# Patient Record
Sex: FEMALE | Race: WHITE | URBAN - METROPOLITAN AREA
[De-identification: names, ages, dates, MRNs, and addresses within clinical notes are randomized per-mention and may not be internally consistent; named-entity substitution may affect disease eponyms.]

---

## 2017-09-20 ENCOUNTER — TRANSFERRED RECORDS (OUTPATIENT)
Dept: HEALTH INFORMATION MANAGEMENT | Facility: CLINIC | Age: 37
End: 2017-09-20

## 2018-01-15 ENCOUNTER — OFFICE VISIT (OUTPATIENT)
Dept: OTOLARYNGOLOGY | Facility: CLINIC | Age: 38
End: 2018-01-15
Payer: COMMERCIAL

## 2018-01-15 VITALS — BODY MASS INDEX: 23.4 KG/M2 | WEIGHT: 158 LBS | HEIGHT: 69 IN

## 2018-01-15 DIAGNOSIS — R49.0 MUSCLE TENSION DYSPHONIA: Primary | ICD-10-CM

## 2018-01-15 DIAGNOSIS — R49.0 DYSPHONIA: Primary | ICD-10-CM

## 2018-01-15 DIAGNOSIS — R07.0 THROAT PAIN: ICD-10-CM

## 2018-01-15 DIAGNOSIS — J38.7 LARYNGEAL HYPERFUNCTION: ICD-10-CM

## 2018-01-15 PROBLEM — Q61.3 POLYCYSTIC KIDNEY DISEASE: Status: ACTIVE | Noted: 2018-01-15

## 2018-01-15 RX ORDER — DEXLANSOPRAZOLE 60 MG/1
CAPSULE, DELAYED RELEASE ORAL
COMMUNITY
Start: 2018-01-10

## 2018-01-15 ASSESSMENT — PAIN SCALES - GENERAL: PAINLEVEL: NO PAIN (0)

## 2018-01-15 NOTE — MR AVS SNAPSHOT
After Visit Summary   1/15/2018    Margret Mccain    MRN: [id number]           Patient Information     Date Of Birth          1980        Visit Information        Provider Department      1/15/2018 2:10 PM Raquel Brian MD [facility] Ear Nose and Throat        Today's Diagnoses     Muscle tension dysphonia    -  1    Laryngeal hyperfunction          Care Instructions    1.  You were seen in the ENT Clinic today by Dr. Brian.  If you have any questions or concerns after your appointment, please call [phone].  Press option #1 for scheduling related needs.  Press option #3 for Nurse advice.  2.  Please initiate speech therapy with a voice specialized SLP.       Yina LIZARRAGA, RN  [facility] ENT   Head & Neck Surgery               Follow-ups after your visit        Additional Services     OTOLARYNGOLOGY REFERRAL       SPEECH-LANGUAGE PATHOLOGY SERVICE(S) REQUESTED:  Evaluate and treat    Merlyn Dixon, Ph.D., CCC/SLP  Speech-Language Pathologist  Director, [facility]  [phone]    Edie Hope M.M., M.A., CCC/SLP  Speech-Language Pathologist  [facility]  [phone]                  Your next 10 appointments already scheduled     Feb 12, 2018  1:00 PM CST   (Arrive by 12:45 PM)   Return Visit with JENNIFER Cardenas   [facility] ([facility])    [address] [place] [id number] [phone]              Who to contact     Please call your clinic at [phone] to:    Ask questions about your health    Make or cancel appointments    Discuss your medicines    Learn about your test results    Speak to your doctor   If you have compliments or concerns about an experience at your clinic, or if you wish to file a complaint, please contact [facility] Patient Relations at [phone] or email us at [email or web address]         Additional Information About  "Your Visit        MyChart Information     [employer] gives you secure access to your electronic health record. If you see a primary care provider, you can also send messages to your care team and make appointments. If you have questions, please call your primary care clinic.  If you do not have a primary care provider, please call [phone] and they will assist you.      [employer] is an electronic gateway that provides easy, online access to your medical records. With [employer], you can request a clinic appointment, read your test results, renew a prescription or communicate with your care team.     To access your existing account, please contact your [facility] or call [phone] for assistance.        Care EveryWhere ID     This is your Care EveryWhere ID. This could be used by other organizations to access your [facility] medical records  [id number]        Your Vitals Were     Height BMI (Body Mass Index)                1.75 m (5' 8.9\") 23.4 kg/m2           Blood Pressure from Last 3 Encounters:   No data found for BP    Weight from Last 3 Encounters:   01/15/18 71.7 kg (158 lb)              We Performed the Following     IMAGESTREAM RECORDING ORDER     LARYNGOSCOPY FLEX/RIGID W STROBOSCOPY     OTOLARYNGOLOGY REFERRAL        Primary Care Provider    None Specified       No primary provider on file.        Equal Access to Services     ARVIN RINCON : Hadii hazel betho Soinderjit, waaxda luqadaha, qaybta kaalmada adeegyada, critsin hoang . So [facility] [phone].    ATENCIÓN: Si habla español, tiene a kwong disposición servicios gratuitos de asistencia lingüística. Llame al [phone].    We comply with applicable federal civil rights laws and [place] laws. We do not discriminate on the basis of race, color, national origin, age, disability, sex, sexual orientation, or gender identity.            Thank you!     Thank you for choosing [facility] EAR NOSE AND " THROAT  for your care. Our goal is always to provide you with excellent care. Hearing back from our patients is one way we can continue to improve our services. Please take a few minutes to complete the written survey that you may receive in the mail after your visit with us. Thank you!             Your Updated Medication List - Protect others around you: Learn how to safely use, store and throw away your medicines at www.disposemymeds.org.          This list is accurate as of 1/15/18 11:59 PM.  Always use your most recent med list.                   Brand Name Dispense Instructions for use Diagnosis    DEXILANT 60 MG Cpdr CR capsule   Generic drug:  dexlansoprazole           IRON SUPPLEMENT PO           VITAMIN D (CHOLECALCIFEROL) PO      Take 5,000 Units by mouth daily

## 2018-01-15 NOTE — PROGRESS NOTES
Dear Colleague:    I had the pleasure of meeting Margret Mccain in consultation at the [facility] of the [facility]ol[facility] on a self-referred basis, for evaluation of dysphonia. The note from our visit follows.  I appreciate the opportunity to participate in the care of this pleasant patient.    Please feel free to contact me with any questions.    Sincerely yours,    Raquel Brian M.D., M.P.H.  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 Symptoms include increased effort to talk/sing, throat irritation, throat tightness, poor voice quality, voice tires easily and change in range.      Voice  The patient reports a 3-1/2 year history of a voice problem.  There is no specific inciting event.  She believes the problem may be related to technique changes and range changes.  The problem began gradually and is worse after heavy voice use.  The problem is stable over time.  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    She has seen multiple otolaryngologists in the past, including Dr. Marvin Clark at the [facility]. She worked with Risa Nicole, JENNIFER in the fall.  She reports previously doing speech therapy locally with a nonspecialized therapist.  She also reports being diagnosed with reflux and treated for a number of years.  However, when she saw Dr. Clark, no evidence of reflux was seen, and the medications were stopped at that time.    She notes that the sensations vary in location and intensity without clear triggers.  Her primary concern is vocal fatigue. She has multiple  performances and heavy voice use coming up. She does report that she re-started Dexilant a few days ago and feels like perhaps her throat tension and voice symptoms have improved in response.      Swallowing  No concerns.       Cough/Throat-clearing  No concerns.       Breathing  No concerns.       Throat discomfort  She reports throat discomfort coming along with her voice concerns over the same period of time.  These also began gradually and are intermittent, but stable over time.   There is no accompanying throat pain. At times she does notice throat tension unexpectedly, when she is not in the middle of doing something.      Reflux-type symptoms  She never experiences heartburn/indigestion. She is taking reflux medications.      Prior outside records were reviewed for this visit.    MEDICATIONS:     Current Outpatient Prescriptions   Medication Sig Dispense Refill     dexlansoprazole (DEXILANT) 60 MG CPDR CR capsule        Ferrous Sulfate (IRON SUPPLEMENT PO)        VITAMIN D, CHOLECALCIFEROL, PO Take 5,000 Units by mouth daily         ALLERGIES:  No Known Allergies    PAST MEDICAL HISTORY:   Past Medical History:   Diagnosis Date     Anemia 1997    Off and on     Gastroesophageal reflux disease 2005    treated for quite awhile. Not sure if still a problem.     Kidney problem 1997    autosomal dominant polycystic kidney disease in family        PAST SURGICAL HISTORY:   Past Surgical History:   Procedure Laterality Date     GI SURGERY  2014    gall bladder removal     GYN SURGERY  2010    post-birth tissue repair     ORTHOPEDIC SURGERY  1997    ACL repair       HABITS/SOCIAL HISTORY:    Social History   Substance Use Topics     Smoking status: Never Smoker     Smokeless tobacco: Never Used     Alcohol use Yes      Comment: very rarely         FAMILY HISTORY:  No family history on file.    REVIEW OF SYSTEMS:  The patient completed a comprehensive 11 point review of systems (below), which was reviewed. Positives  are as noted below; pertinent findings are as noted in the HPI.     Patient Supplied Answers to Review of Systems  No flowsheet data found.    PHYSICAL EXAMINATION:  General: The patient was alert and conversant, and in no acute distress.    Eyes: PERRL, conjunctiva and lids normal, sclera nonicteric.  Nose: Anterior rhinoscopy: no gross abnormalities. no  bleeding; no  mucopurulence; septum grossly normal, mild mucoid drainage and/or crusting.  Oral cavity/oropharynx: No masses or lesions. Dentition in good condition. Floor of mouth and oral tongue soft to palpation. Tongue mobility and palate elevation intact and symmetric.  Ears: Normal auricles, external auditory canals bilaterally. Visualized portions of tympanic membranes normal bilaterally.   Neck: No palpable cervical lymphadenopathy. There was no significant tenderness to palpation of the thyrohyoid space, which was narrow. No obvious thyroid abnormality. Landmarks palpable.  Resp: Breathing comfortably, no stridor or stertor.  Neuro: Symmetric facial function. Other cranial nerves as documented above.  Psych: Normal affect, pleasant and cooperative.  Voice/speech: No significant dysphonia of speaking voice.  Extremities: No cyanosis, clubbing, or edema of the upper extremities.    Intake scores  Total Score for Last Patient-Answered VHI Questionnaire  VHI Total Score 1/4/2018   VHI Total Score 15     Total Score for Last Patient-Answered EAT Questionnaire  EAT Total Score 1/4/2018   EAT Total Score 0     Total Score for Last Patient-Answered CSI Questionnaire  CSI Total Score 1/4/2018   CSI Total Score 0         PROCEDURE:   Flexible fiberoptic laryngoscopy and laryngovideostroboscopy  Indications: This procedure was warranted to evaluate the patient's laryngeal anatomy and function. Risks, benefits, and alternatives were discussed.  Description: After written informed consent was obtained, a time-out was performed to confirm patient identity, procedure,  and procedure site. Topical 3% lidocaine with 0.25% phenylephrine was applied to the nasal cavities. I performed the endoscopy and no complications were apparent. Continuous and stroboscopic light were utilized to assess routine phonation and variable frequency phonation.  Performed by: Raquel Brian MD MPH  SLP: Edie Hope MM, MA, CCC-SLP   Findings: Normal nasopharynx. Normal base of tongue, valleculae, and epiglottis. Vocal fold mobility: right: normal; left: normal. Medial edges of the vocal folds: smooth and straight. No focal mucosal lesions were observed on the true vocal folds. Glissade produced appropriate elongation. There was moderate to severe supraglottic recruitment with connected speech. Mucosa of false vocal folds, aryepiglottic folds, piriform sinuses, and posterior glottis unremarkable. Airway was patent.    The addition of stroboscopy allowed evaluation of the mucosal wave.   Amplitude: right: normal; left: normal. Symmetry: good symmetry. Closure pattern: normal. Closure plane: at glottic level. Phase distribution: normal.      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     I recommended speech therapy as initial primary management, with goals including reducing laryngeal irritability, improving laryngeal efficiency and improving respiratory/phonatory coordination.      She asked about an unusual reflux-related reflex and whether that might be causing her symptoms. We discussed that while I cannot rule that out today, her presentation is fairly characteristic for muscle tension dysphonia and it would be logical to begin by first treating that with the standard of care, which is speech therapy. I encouraged her to pursue this with an Speech Language Pathologist who has specific expertise in voice and singing. She seemed comfortable with this thought process.    She will return as needed. I appreciate the opportunity to participate in the  care of this pleasant patient.

## 2018-01-15 NOTE — LETTER
1/15/2018      RE: Margret Mccain  [address] [facility] [address]       Dear Colleague:    I had the pleasure of meeting Margret Mccain in consultation at the [facility] of the [facility] on a self-referred basis, for evaluation of dysphonia. The note from our visit follows.  I appreciate the opportunity to participate in the care of this pleasant patient.    Please feel free to contact me with any questions.    Sincerely yours,    Raquel Brian M.D., M.P.H.  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 Symptoms include increased effort to talk/sing, throat irritation, throat tightness, poor voice quality, voice tires easily and change in range.      Voice  The patient reports a 3-1/2 year history of a voice problem.  There is no specific inciting event.  She believes the problem may be related to technique changes and range changes.  The problem began gradually and is worse after heavy voice use.  The problem is stable over time.  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    She has seen multiple otolaryngologists in the past, including Dr. Marvin Clark at the [facility]. She worked with Risa Nicole, JENNIFER in the fall.  She reports previously doing speech therapy locally with a nonspecialized therapist.  She also reports being diagnosed with reflux and treated for a number of years.  However, when she saw Dr. Clark, no evidence of reflux was seen, and the medications were stopped at that time.    She notes that the sensations vary in location and intensity without  clear triggers.  Her primary concern is vocal fatigue. She has multiple performances and heavy voice use coming up. She does report that she re-started Dexilant a few days ago and feels like perhaps her throat tension and voice symptoms have improved in response.      Swallowing  No concerns.       Cough/Throat-clearing  No concerns.       Breathing  No concerns.       Throat discomfort  She reports throat discomfort coming along with her voice concerns over the same period of time.  These also began gradually and are intermittent, but stable over time.   There is no accompanying throat pain. At times she does notice throat tension unexpectedly, when she is not in the middle of doing something.      Reflux-type symptoms  She never experiences heartburn/indigestion. She is taking reflux medications.      Prior outside records were reviewed for this visit.    MEDICATIONS:     Current Outpatient Prescriptions   Medication Sig Dispense Refill     dexlansoprazole (DEXILANT) 60 MG CPDR CR capsule        Ferrous Sulfate (IRON SUPPLEMENT PO)        VITAMIN D, CHOLECALCIFEROL, PO Take 5,000 Units by mouth daily         ALLERGIES:  No Known Allergies    PAST MEDICAL HISTORY:   Past Medical History:   Diagnosis Date     Anemia 1997    Off and on     Gastroesophageal reflux disease 2005    treated for quite awhile. Not sure if still a problem.     Kidney problem 1997    autosomal dominant polycystic kidney disease in family        PAST SURGICAL HISTORY:   Past Surgical History:   Procedure Laterality Date     GI SURGERY  2014    gall bladder removal     GYN SURGERY  2010    post-birth tissue repair     ORTHOPEDIC SURGERY  1997    ACL repair       HABITS/SOCIAL HISTORY:    Social History   Substance Use Topics     Smoking status: Never Smoker     Smokeless tobacco: Never Used     Alcohol use Yes      Comment: very rarely         FAMILY HISTORY:  No family history on file.    REVIEW OF SYSTEMS:  The patient completed a  comprehensive 11 point review of systems (below), which was reviewed. Positives are as noted below; pertinent findings are as noted in the HPI.     Patient Supplied Answers to Review of Systems  No flowsheet data found.    PHYSICAL EXAMINATION:  General: The patient was alert and conversant, and in no acute distress.    Eyes: PERRL, conjunctiva and lids normal, sclera nonicteric.  Nose: Anterior rhinoscopy: no gross abnormalities. no  bleeding; no  mucopurulence; septum grossly normal, mild mucoid drainage and/or crusting.  Oral cavity/oropharynx: No masses or lesions. Dentition in good condition. Floor of mouth and oral tongue soft to palpation. Tongue mobility and palate elevation intact and symmetric.  Ears: Normal auricles, external auditory canals bilaterally. Visualized portions of tympanic membranes normal bilaterally.   Neck: No palpable cervical lymphadenopathy. There was no significant tenderness to palpation of the thyrohyoid space, which was narrow. No obvious thyroid abnormality. Landmarks palpable.  Resp: Breathing comfortably, no stridor or stertor.  Neuro: Symmetric facial function. Other cranial nerves as documented above.  Psych: Normal affect, pleasant and cooperative.  Voice/speech: No significant dysphonia of speaking voice.  Extremities: No cyanosis, clubbing, or edema of the upper extremities.    Intake scores  Total Score for Last Patient-Answered VHI Questionnaire  VHI Total Score 1/4/2018   VHI Total Score 15     Total Score for Last Patient-Answered EAT Questionnaire  EAT Total Score 1/4/2018   EAT Total Score 0     Total Score for Last Patient-Answered CSI Questionnaire  CSI Total Score 1/4/2018   CSI Total Score 0         PROCEDURE:   Flexible fiberoptic laryngoscopy and laryngovideostroboscopy  Indications: This procedure was warranted to evaluate the patient's laryngeal anatomy and function. Risks, benefits, and alternatives were discussed.  Description: After written informed consent  was obtained, a time-out was performed to confirm patient identity, procedure, and procedure site. Topical 3% lidocaine with 0.25% phenylephrine was applied to the nasal cavities. I performed the endoscopy and no complications were apparent. Continuous and stroboscopic light were utilized to assess routine phonation and variable frequency phonation.  Performed by: Raquel Brian MD MPH  SLP: Edie Hope MM, MA, CCC-SLP   Findings: Normal nasopharynx. Normal base of tongue, valleculae, and epiglottis. Vocal fold mobility: right: normal; left: normal. Medial edges of the vocal folds: smooth and straight. No focal mucosal lesions were observed on the true vocal folds. Glissade produced appropriate elongation. There was moderate to severe supraglottic recruitment with connected speech. Mucosa of false vocal folds, aryepiglottic folds, piriform sinuses, and posterior glottis unremarkable. Airway was patent.    The addition of stroboscopy allowed evaluation of the mucosal wave.   Amplitude: right: normal; left: normal. Symmetry: good symmetry. Closure pattern: normal. Closure plane: at glottic level. Phase distribution: normal.      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     I recommended speech therapy as initial primary management, with goals including reducing laryngeal irritability, improving laryngeal efficiency and improving respiratory/phonatory coordination.      She asked about an unusual reflux-related reflex and whether that might be causing her symptoms. We discussed that while I cannot rule that out today, her presentation is fairly characteristic for muscle tension dysphonia and it would be logical to begin by first treating that with the standard of care, which is speech therapy. I encouraged her to pursue this with an Speech Language Pathologist who has specific expertise in voice and singing. She seemed comfortable with this thought  process.    She will return as needed. I appreciate the opportunity to participate in the care of this pleasant patient.           Raquel Brian MD

## 2018-01-15 NOTE — LETTER
"1/15/2018       RE: Margret Mccain  [facility] [address]     Dear Colleague,    Thank you for referring your patient, Margret Mccain, to the  [facility] at [facility]. Please see a copy of my visit note below.    After Visit Summary    Patient: Margret Mccain  Date of Visit: 1/15/2018    Hygiene:     Topical Hydration: hydration for the surface mucosa of the larynx and vocal folds.  o Please review Tips for Topical Hydration handout that was provided during session.  o I especially recommended increased gargling, as well as nasal irrigation and use of saline spray throughout the day.    Voice (2-3x/day unless otherwise noted):    Semi-occluded vocal tract exercise:   o Hunnnn/Tongue trill/Bubbles (straw in 1.5 to 2  of water) 4x/day for 1-2 min:  o 3x: sustained phonation (Ab3 = modal pitch today)  o 3x: gliding up and down             Up and down like a sine wave  o 3x:soft to loud to soft (messa di voce)    o 1-2x: Happy birthday (keep connected), or use for \"n\" or \"ng\" for practicing repertoire  These exercises are great for:    *warm up / cool down - Part of the morning routing and before and after extended voice use.    *tissue mobilization exercise - Improving the condition and pliability of the vocal folds.    *Abdominal breathing and applying optimal breath flow to speech/singing.       N+ vowels      Nnnnn-hnnnnn, Nnnnn-hnnnnnn yeah (carryover for speech)     n  words  o hold onto the  n  at the beginning of the word  o 5-1 descending pattern used today     n  sentences  o 3-5-1      Blending phrases: Practice longer phrases, also maintain a more optimal forward focus.      Consider purchasing \"The Voice Book\": Evie which also touches on resonant voice exercises      Areas for work with a massage therapist and/or PT who specializes in myofascial release of the head and neck area:   SCMs, scalenes, levator scapulae (bilateral), muscles and " "tissue involving and surrounding base of tongue, all strap muscles, shoulders, upper traps, diaphragm, any muscles involved in respiration and phonation.    Edie Hope M.M. (voice), M.A., CCC/SLP  Speech-Language Pathologist  [facility]  [phone]    [facility]  Myke Tirado Jr., M.D., F.A.C.S.  Raquel Brian M.D., M.P.H.  Merlyn Dixon, Ph.D., CCC/SLP  Edie Hope M.M. (voice), M.A., CCC/SLP  Krishna Sutton M.M. (voice), M.A., CCC/SLP    Evaluation report    Clinician: Edie Hope M.M. (voice), M.A., CCC/SLP  Seen in conjunction with: Dr. Brian  Referring physician:  [place]  Patient: Margret Mccain  Date of Visit: 1/15/2018    HISTORY  Chief complaint: Margret Mccain is a 37 year old presenting today for evaluation of dysphonia.  Salient history: She has a history significant for dysphonia over the last 3.5 years, which has been evaluated at other clinics in the past.  Onset: 3.5 years ago  Inciting incident: none  Course: relatively stable    CURRENT SYMPTOMS INCLUDE  VOICE    3.5 year Hx of voice issues that involve chronic fatigue while singing, increased effort to talk/ sing, poor voice quality, and changes in range.    Notes that her the throat tension she experiences along with poor voice quality is not tied to the act of singing.    Questions possible compensatory muscle issues with the throat and neck.    She has increased singing and speaking demands.  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      She has an additional 10 hours per week of \"lighter duties\" in the [employer], etc, where she often speaks over noise.    At home she often speaks with her  and three children.    She has intermittently completed brief courses of speech therapy elsewhere, but it has been inconsistent without long term success.    Her last intervention with Dr. Risa Nicole at the [facility] was for a brief session that involved SOVT and exercises to release " "base of tongue tension.    She notes that her singing voice often \"cuts out\" around F5-G5, while other times she will notice that her middle range around A4-B4 will have delayed onsets after only 5 minutes of singing.    She has not completed any vocalizing today, but they had a 4 hour drive from home.    COUGH/THROAT CLEARING    Denies any significant coughing or throat clearing.    Does experience throat irritation and tightness of the intrinsic and extrinsic neck muscles.  Neck tension seems to \"move around\", and can involve the back of the neck, sides, and/or near the clavicular notch.    SWALLOWING    Denies significant issues    RESPIRATION    WNL    ADDITIONAL    Denies Hx of reflux, but anti-reflux medications have been tried in the past, and she has been on Dexilant for the last 4 days; maybe helpful, but not sure.      She questions whether she may have a rare nerve issue associated with reflux today.    OTHER PERTINENT HISTORY    Otherwise unknown.  Please also refer to Dr. Brian's dictation.     Past Medical History:   Diagnosis Date     Anemia     Off and on     Gastroesophageal reflux disease     treated for quite awhile. Not sure if still a problem.     Kidney problem     autosomal dominant polycystic kidney disease in family     Past Surgical History:   Procedure Laterality Date     GI SURGERY      gall bladder removal     GYN SURGERY      post-birth tissue repair     ORTHOPEDIC SURGERY      ACL repair       OBJECTIVE  PATIENT REPORTED MEASURES    Effort to talk: 5 / 10 (0-10 in which 10 represents maximal effort)    Effort to sin / 10 (0-10 in which 10 represents maximal effort)    Voice quality: 5 / 10 (0-10 in which 10 represents best possible voice)  Patient Supplied Answers To VHI Questionnaire  Voice Handicap Index (VHI-10) 2018   My voice makes it difficult for people to hear me 2   People have difficulty understanding me in a noisy room 2   My voice " "difficulties restrict my personal and social life.  1   I feel left out of conversations because of my voice 0   My voice problem causes me to lose income 1   I feel as though I have to strain to produce voice 2   The clarity of my voice is unpredictable 2   My voice problem upsets me 4   My voice makes me feel handicapped 1   People ask, \"What's wrong with your voice?\" 0   VHI-10 15       Patient Supplied Answers To CSI Questionnaire  Cough Severity Index (CSI) 1/4/2018   My cough is worse when I lie down 0   My coughing problem causes me to restrict my personal and social life 0   I tend to avoid places because of my cough problem 0   I feel embarrassed because of my coughing problem 0   People ask, ''What's wrong?'' because I cough a lot 0   I run out of air when I cough 0   My coughing problem affects my voice 0   My coughing problem limits my physical activity 0   My coughing problem upsets me 0   People ask me if I am sick because I cough a lot 0   CSI Score 0       Patient Supplied Answers To EAT Questionnaire  Eating Assessment Tool (EAT-10) 1/4/2018   My swallowing problem has caused me to lose weight 0   My swallowing problem interferes with my ability to go out for meals 0   Swallowing liquids takes extra effort 0   Swallowing solids takes extra effort 0   Swallowing pills takes extra effort 0   Swallowing is painful 0   The pleasure of eating is affected by my swallowing 0   When I swallow food sticks in my throat 0   I cough when I eat 0   Swallowing is stressful 0   EAT-10 0       PERCEPTUAL EVALUATION (CPT 14229) - Recorded  POSTURE / TENSION:     upper body    neck and shoulders    BREATHING:     appears within normal limits and adequate     LARYNGEAL PALPATION:     firm musculature    Denies significant awareness of tenderness of the thyrohyoid area    reduced thyrohyoid space    VOICE:    Roughness: Mild Intermittent    Breathiness: WNL    Strain: WNL     Intermittent glottal " cui    Loudness    Conversational speech:  WNL    Projected speech:  WNL    Pitch:    Conversational speech:  WNL    Pitch glide: mild passaggio break present during ascending glide > than while descending    Resonance:    Conversational speech:  backward focus of resonance    Singing vs. Speech:  Singing voice is somewhat improved    CAPE-V Overall Severity:  41/100    COUGH/THROAT CLEARING:    Not observed    THERAPY PROBES: Improvement was elicited with use of forward resonant stimuli; less with other techniques today    LARYNGEAL EXAMINATION  Procedure: Flexible endoscopy with chip-tip technology with stroboscopy, right nostril; topical anesthesia with 3% Lidocaine and 0.25% phenylephrine was applied.   Performed by: Dr. Brian   The laryngeal and pharyngeal structures were evaluated for gross appearance, mobility, function, and focal lesions / abnormalities of the associated mucosa.  Stroboscopy was warranted to evaluate closure, symmetry, and vibratory characteristics of the vocal folds.  All findings were within normal limits with the exception of the following salient features:     Slight anterior gap observed while sustaining pitches.    Moderate to severe AP supraglottic constriction during connected speech and some singing tasks.    Therapeutic probes demonstrated increased AP constriction while attempting to utilize forward resonant techniques while completing techniques to improve breath flow; however, improvement with techniques was observed with them in isolation.    Stroboscopy demonstrated a relatively normal mucosal wave.      Abducted view of a healthy larynx      Slight anterior glottic gap    The laryngeal exam was reviewed with MsLissy Mccain, and I provided pertinent explanations, as well as written and oral information.    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 Dysphonia/discomfort is accounted for by the hyperfunction and imbalanced function of the intrinsic and extrinsic laryngeal musculature  .    STIMULABILITY: results of therapy probes during perceptual and laryngeal evaluation demonstrate improvement with use of forward resonant stimuli    RECOMMENDATIONS:     A course of speech therapy is recommended to optimize vocal technique, improve voice quality, promote reduced discomfort, effort and fatigue and help reduce mucosal irritation and neck tension.    She demonstrates a Good prognosis for improvement given adherence to therapeutic recommendations.     Positive indicators: positive response to therapy probes diagnosis is known to respond to treatment    Negative indicators: Distance from clinic is a consideration for her (approx 4 hours one way)    DURATION / FREQUENCY: 2 one-hour sessions weekly sessions, followed by 2 one-hour biweekly sessions.  A total of 6-8 sessions may be necessary.    GOALS:  Patient goal:   To improve and maintain a healthy voice quality  To understand the problem and fix it as much as possible  To have a normal and acceptable voice quality  To recover her former signing voice quality     Long-term goal(s): In 6 months, Ms. Mccain will:  Report a week of typical vocal activities, in which dysphonia and discomfort do not exceed a level of 3 out of 10, 80% of the time   Report a speaking and singing voice quality that is acceptable to her, 90%of the time  In a 20-minute conversation task, demonstrate 90% accuracy with forward resonance/optimal breath flow, by therapist judgment  In a singing task, demonstrate ability to vocalize to B4, five times in 10 minutes, with breathiness, delayed onset, and strain that does not exceed a level of 3 out of 10, 80% of the time, by therapist judgment    This treatment plan was developed with the patient who agreed with the  "recommendations.    _______________________________________________________________________  THERAPY NOTE (CPT 83322)  Date of Service: 1/15/2018    SUBJECTIVE / OBJECTIVE:  Please refer to my evaluation report from today's encounter for full details regarding subjective data, patient reported measures, and diagnostic findings.    THERAPEUTIC ACTIVITIES  Counseling and Education    Asked many questions about the nature of her symptoms, and I answered all of these thoroughly.    Exercises and techniques for optimal vocal hygiene including:    Topical hydration - Gargling (saline and plain water), saline nasal irrigation, humidification, steam, guaifenesin to reduce the thickened secretions / laryngeal irritation.    Awareness and reduction of phonotraumatic behaviors    Moderating voice use    Substituting non-voice alternative behaviors    Avoiding cough and throat clearing    Semi-Occluded Vocal Tract (SOVT) exercises instructed to reduce laryngeal tension, promote vocal fold pliability, and coordinate respiration and phonation    Tongue trill, /n/ and Straw phonation with water resistance was found to be most facilitating     Ascending and descending glides utilized to promote vocal fold pliability    \"Messa di voce\", gradual crescendo and decrescendo to vary medial compression was also utilized to promote vocal fold pliability.    Instructed on the benefits of using these exercises for improved coordination of breath flow with phonation and tissue mobilization.    Instructed on the importance of using these exercises as a warm-up / cool down,  and to re-calibrate the voice throughout the day.    75% accuracy with mild to moderate clinician support    Resonant Voice Therapy (RVT) exercises to promote forward locus of resonance and optimized pattern of laryngeal adduction    Speech material that elicits a high, forward tongue position (/n/) was most facilitating    Easy descending glide on /n/ utilized in " "conjunction with relaxed jaw, tongue, and lightly closed lips to facilitate forward resonant sound    Use of the carrier phrase \"nnhnn\" instructed to promote generalization to everyday speech    Syllable level using /n/ in alternation with cardinal vowels on sustained pitches and speech inflection    Word level exercises featuring nasal continuant loaded stimuli    Phrase level exercises featuring nasal continuants in more complex phonemic contexts were employed    Instructed with and interval of a descending 5th, as well as an arpeggio pattern was facilitating, prior to progressing to comfortable speech using optimal breath flow.    Negative practice was employed and was facilitative    Able to recognize improvement in quality and comfort      Concepts of an optimal regimen for practice were instructed.  o She should use an interval schedule of practice, with brief periods of practice frequently throughout each day  o [place] concepts of volitional practice to facilitate motor learning.    I provided an audio recording and handouts of today's therapeutic activities to facilitate practice.    ASSESSMENT/PLAN  PROGRESS TOWARD LONG TERM GOALS:   Minimal at this point, as this is first session, but good learning today    IMPRESSIONS: R49.0 (Dysphonia) and R07.0 (Throat Pain).     PLAN: I will see Ms. Mccain in 3 weeks, at which time we will continue to work with resonant voice techniques.     TOTAL SERVICE TIME: 30 minutes  EVALUATION OF VOICE AND RESONANCE: (12047): 60 minutes    TREATMENT (06835): 30 minutes  NO CHARGE FACILITY FEE (64321)    Edie Hope M.M. (voice) MOSWALDO, CCC/SLP  Speech-Language Pathologist  [facility]  [phone]  "

## 2018-01-15 NOTE — PATIENT INSTRUCTIONS
1.  You were seen in the ENT Clinic today by Dr. Brian.  If you have any questions or concerns after your appointment, please call [phone].  Press option #1 for scheduling related needs.  Press option #3 for Nurse advice.  2.  Please initiate speech therapy with a voice specialized SLP.       Yina LIZARRAGA, RN  [facility]   Head & Neck Surgery

## 2018-01-15 NOTE — MR AVS SNAPSHOT
After Visit Summary   1/15/2018    Mragret Mccain    MRN: [id number]           Patient Information     Date Of Birth          1980        Visit Information        Provider Department      1/15/2018 2:10 PM Edie Hope, SLP [facility]        Today's Diagnoses     Dysphonia    -  1    Throat pain           Follow-ups after your visit        Who to contact     Please call your clinic at [phone] to:    Ask questions about your health    Make or cancel appointments    Discuss your medicines    Learn about your test results    Speak to your doctor   If you have compliments or concerns about an experience at your clinic, or if you wish to file a complaint, please contact [facility] Patient Relations at [phone] or email us at [email or web address]         Additional Information About Your Visit        MyChart Information     [employer] gives you secure access to your electronic health record. If you see a primary care provider, you can also send messages to your care team and make appointments. If you have questions, please call your primary care clinic.  If you do not have a primary care provider, please call [phone] and they will assist you.      [employer] is an electronic gateway that provides easy, online access to your medical records. With [employer], you can request a clinic appointment, read your test results, renew a prescription or communicate with your care team.     To access your existing account, please contact your [facility] or call [phone] for assistance.        Care EveryWhere ID     This is your Care EveryWhere ID. This could be used by other organizations to access your [facility] medical records  [id number]         Blood Pressure from Last 3 Encounters:   No data found for BP    Weight from Last 3 Encounters:   01/15/18 71.7 kg (158 lb)              We Performed the Following     C BEHAVIORAL &  QUALITATIVE ANALYSIS VOICE AND RESONANCE     SPEECH/HEARING THERAPY, INDIVIDUAL        Primary Care Provider    None Specified       No primary provider on file.        Equal Access to Services     ARVIN RINCON : Hadii aad ku hadbaldemaremely Brennan, ninoska cosme, christopherreinier albarranmitchelcristin almanzaregancristy prajapati. So [facility] [phone].    ATENCIÓN: Si habla español, tiene a kwong disposición servicios gratuitos de asistencia lingüística. Llame al [phone].    We comply with applicable federal civil rights laws and [place] laws. We do not discriminate on the basis of race, color, national origin, age, disability, sex, sexual orientation, or gender identity.            Thank you!     Thank you for choosing [facility]  for your care. Our goal is always to provide you with excellent care. Hearing back from our patients is one way we can continue to improve our services. Please take a few minutes to complete the written survey that you may receive in the mail after your visit with us. Thank you!             Your Updated Medication List - Protect others around you: Learn how to safely use, store and throw away your medicines at www.disposemymeds.org.          This list is accurate as of: 1/15/18 11:59 PM.  Always use your most recent med list.                   Brand Name Dispense Instructions for use Diagnosis    DEXILANT 60 MG Cpdr CR capsule   Generic drug:  dexlansoprazole           IRON SUPPLEMENT PO           VITAMIN D (CHOLECALCIFEROL) PO      Take 5,000 Units by mouth daily

## 2018-01-16 NOTE — PROGRESS NOTES
"After Visit Summary    Patient: Margret Mccain  Date of Visit: 1/15/2018    Hygiene:     Topical Hydration: hydration for the surface mucosa of the larynx and vocal folds.  o Please review Tips for Topical Hydration handout that was provided during session.  o I especially recommended increased gargling, as well as nasal irrigation and use of saline spray throughout the day.    Voice (2-3x/day unless otherwise noted):    Semi-occluded vocal tract exercise:   o Hunnnn/Tongue trill/Bubbles (straw in 1.5 to 2  of water) 4x/day for 1-2 min:  o 3x: sustained phonation (Ab3 = modal pitch today)  o 3x: gliding up and down             Up and down like a sine wave  o 3x:soft to loud to soft (messa di voce)    o 1-2x: Happy birthday (keep connected), or use for \"n\" or \"ng\" for practicing repertoire  These exercises are great for:    *warm up / cool down - Part of the morning routing and before and after extended voice use.    *tissue mobilization exercise - Improving the condition and pliability of the vocal folds.    *Abdominal breathing and applying optimal breath flow to speech/singing.       N+ vowels      Nnnnn-hnnnnn, Nnnnn-hnnnnnn yeah (carryover for speech)     n  words  o hold onto the  n  at the beginning of the word  o 5-1 descending pattern used today     n  sentences  o 3-5-1      Blending phrases: Practice longer phrases, also maintain a more optimal forward focus.      Consider purchasing \"The Voice Book\": Evie which also touches on resonant voice exercises      Areas for work with a massage therapist and/or PT who specializes in myofascial release of the head and neck area:   SCMs, scalenes, levator scapulae (bilateral), muscles and tissue involving and surrounding base of tongue, all strap muscles, shoulders, upper traps, diaphragm, any muscles involved in respiration and phonation.    Edie Hope M.M. (voice), MLissyA., CCC/SLP  Speech-Language Pathologist  [facility] " [facility]  [phone]

## 2018-01-18 NOTE — PROGRESS NOTES
"[facility] VOICE CLINIC  Myke Tirado Jr., M.D., F.A.C.S.  Raquel Brian M.D., M.P.H.  Merlyn Dixon, Ph.D., CCC/SLP  Edie Hope M.M. (voice), M.A., CCC/SLP  Krishna Sutton M.M. (voice), M.A., CCC/SLP    Evaluation report    Clinician: Edie Hope M.M. (voice), M.A., CCC/SLP  Seen in conjunction with: Dr. Brian  Referring physician:  [place]  Patient: Margret Mccain  Date of Visit: 1/15/2018    HISTORY  Chief complaint: Margret Mccain is a 37 year old presenting today for evaluation of dysphonia.  Salient history: She has a history significant for dysphonia over the last 3.5 years, which has been evaluated at other clinics in the past.  Onset: 3.5 years ago  Inciting incident: none  Course: relatively stable    CURRENT SYMPTOMS INCLUDE  VOICE    3.5 year Hx of voice issues that involve chronic fatigue while singing, increased effort to talk/ sing, poor voice quality, and changes in range.    Notes that her the throat tension she experiences along with poor voice quality is not tied to the act of singing.    Questions possible compensatory muscle issues with the throat and neck.    She has increased singing and speaking demands.  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      She has an additional 10 hours per week of \"lighter duties\" in the [employer] shop, etc, where she often speaks over noise.    At home she often speaks with her  and three children.    She has intermittently completed brief courses of speech therapy elsewhere, but it has been inconsistent without long term success.    Her last intervention with Dr. Risa Nicole at the [facility] was for a brief session that involved SOVT and exercises to release base of tongue tension.    She notes that her singing voice often \"cuts out\" around F5-G5, while other times she will notice that her middle range around A4-B4 will have delayed onsets after only 5 minutes of singing.    She has not completed any vocalizing today, " "but they had a 4 hour drive from home.    COUGH/THROAT CLEARING    Denies any significant coughing or throat clearing.    Does experience throat irritation and tightness of the intrinsic and extrinsic neck muscles.  Neck tension seems to \"move around\", and can involve the back of the neck, sides, and/or near the clavicular notch.    SWALLOWING    Denies significant issues    RESPIRATION    WNL    ADDITIONAL    Denies Hx of reflux, but anti-reflux medications have been tried in the past, and she has been on Dexilant for the last 4 days; maybe helpful, but not sure.      She questions whether she may have a rare nerve issue associated with reflux today.    OTHER PERTINENT HISTORY    Otherwise unknown.  Please also refer to Dr. Brian's dictation.     Past Medical History:   Diagnosis Date     Anemia     Off and on     Gastroesophageal reflux disease     treated for quite awhile. Not sure if still a problem.     Kidney problem     autosomal dominant polycystic kidney disease in family     Past Surgical History:   Procedure Laterality Date     GI SURGERY      gall bladder removal     GYN SURGERY      post-birth tissue repair     ORTHOPEDIC SURGERY      ACL repair       OBJECTIVE  PATIENT REPORTED MEASURES    Effort to talk: 5 / 10 (0-10 in which 10 represents maximal effort)    Effort to sin / 10 (0-10 in which 10 represents maximal effort)    Voice quality: 5 / 10 (0-10 in which 10 represents best possible voice)  Patient Supplied Answers To VHI Questionnaire  Voice Handicap Index (VHI-10) 2018   My voice makes it difficult for people to hear me 2   People have difficulty understanding me in a noisy room 2   My voice difficulties restrict my personal and social life.  1   I feel left out of conversations because of my voice 0   My voice problem causes me to lose income 1   I feel as though I have to strain to produce voice 2   The clarity of my voice is unpredictable 2   My voice problem " "upsets me 4   My voice makes me feel handicapped 1   People ask, \"What's wrong with your voice?\" 0   VHI-10 15       Patient Supplied Answers To CSI Questionnaire  Cough Severity Index (CSI) 1/4/2018   My cough is worse when I lie down 0   My coughing problem causes me to restrict my personal and social life 0   I tend to avoid places because of my cough problem 0   I feel embarrassed because of my coughing problem 0   People ask, ''What's wrong?'' because I cough a lot 0   I run out of air when I cough 0   My coughing problem affects my voice 0   My coughing problem limits my physical activity 0   My coughing problem upsets me 0   People ask me if I am sick because I cough a lot 0   CSI Score 0       Patient Supplied Answers To EAT Questionnaire  Eating Assessment Tool (EAT-10) 1/4/2018   My swallowing problem has caused me to lose weight 0   My swallowing problem interferes with my ability to go out for meals 0   Swallowing liquids takes extra effort 0   Swallowing solids takes extra effort 0   Swallowing pills takes extra effort 0   Swallowing is painful 0   The pleasure of eating is affected by my swallowing 0   When I swallow food sticks in my throat 0   I cough when I eat 0   Swallowing is stressful 0   EAT-10 0       PERCEPTUAL EVALUATION (CPT 65196) - Recorded  POSTURE / TENSION:     upper body    neck and shoulders    BREATHING:     appears within normal limits and adequate     LARYNGEAL PALPATION:     firm musculature    Denies significant awareness of tenderness of the thyrohyoid area    reduced thyrohyoid space    VOICE:    Roughness: Mild Intermittent    Breathiness: WNL    Strain: WNL     Intermittent glottal cui    Loudness    Conversational speech:  WNL    Projected speech:  WNL    Pitch:    Conversational speech:  WNL    Pitch glide: mild passaggio break present during ascending glide > than while descending    Resonance:    Conversational speech:  backward focus of resonance    Singing vs. Speech:  " Singing voice is somewhat improved    CAPE-V Overall Severity:  41/100    COUGH/THROAT CLEARING:    Not observed    THERAPY PROBES: Improvement was elicited with use of forward resonant stimuli; less with other techniques today    LARYNGEAL EXAMINATION  Procedure: Flexible endoscopy with chip-tip technology with stroboscopy, right nostril; topical anesthesia with 3% Lidocaine and 0.25% phenylephrine was applied.   Performed by: Dr. Brian   The laryngeal and pharyngeal structures were evaluated for gross appearance, mobility, function, and focal lesions / abnormalities of the associated mucosa.  Stroboscopy was warranted to evaluate closure, symmetry, and vibratory characteristics of the vocal folds.  All findings were within normal limits with the exception of the following salient features:     Slight anterior gap observed while sustaining pitches.    Moderate to severe AP supraglottic constriction during connected speech and some singing tasks.    Therapeutic probes demonstrated increased AP constriction while attempting to utilize forward resonant techniques while completing techniques to improve breath flow; however, improvement with techniques was observed with them in isolation.    Stroboscopy demonstrated a relatively normal mucosal wave.      Abducted view of a healthy larynx      Slight anterior glottic gap    The laryngeal exam was reviewed with Ms. Mccain, and I provided pertinent explanations, as well as written and oral information.    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 Dysphonia/discomfort is accounted for by the hyperfunction and imbalanced function of the intrinsic and extrinsic laryngeal musculature  .    STIMULABILITY: results of therapy probes during perceptual and laryngeal evaluation demonstrate improvement with use of  forward resonant stimuli    RECOMMENDATIONS:     A course of speech therapy is recommended to optimize vocal technique, improve voice quality, promote reduced discomfort, effort and fatigue and help reduce mucosal irritation and neck tension.    She demonstrates a Good prognosis for improvement given adherence to therapeutic recommendations.     Positive indicators: positive response to therapy probes diagnosis is known to respond to treatment    Negative indicators: Distance from clinic is a consideration for her (approx 4 hours one way)    DURATION / FREQUENCY: 2 one-hour sessions weekly sessions, followed by 2 one-hour biweekly sessions.  A total of 6-8 sessions may be necessary.    GOALS:  Patient goal:   To improve and maintain a healthy voice quality  To understand the problem and fix it as much as possible  To have a normal and acceptable voice quality  To recover her former signing voice quality     Long-term goal(s): In 6 months, Ms. Mccain will:  Report a week of typical vocal activities, in which dysphonia and discomfort do not exceed a level of 3 out of 10, 80% of the time   Report a speaking and singing voice quality that is acceptable to her, 90%of the time  In a 20-minute conversation task, demonstrate 90% accuracy with forward resonance/optimal breath flow, by therapist judgment  In a singing task, demonstrate ability to vocalize to B4, five times in 10 minutes, with breathiness, delayed onset, and strain that does not exceed a level of 3 out of 10, 80% of the time, by therapist judgment    This treatment plan was developed with the patient who agreed with the recommendations.    _______________________________________________________________________  THERAPY NOTE (CPT 23496)  Date of Service: 1/15/2018    SUBJECTIVE / OBJECTIVE:  Please refer to my evaluation report from today's encounter for full details regarding subjective data, patient reported measures, and diagnostic findings.    THERAPEUTIC  "ACTIVITIES  Counseling and Education    Asked many questions about the nature of her symptoms, and I answered all of these thoroughly.    Exercises and techniques for optimal vocal hygiene including:    Topical hydration - Gargling (saline and plain water), saline nasal irrigation, humidification, steam, guaifenesin to reduce the thickened secretions / laryngeal irritation.    Awareness and reduction of phonotraumatic behaviors    Moderating voice use    Substituting non-voice alternative behaviors    Avoiding cough and throat clearing    Semi-Occluded Vocal Tract (SOVT) exercises instructed to reduce laryngeal tension, promote vocal fold pliability, and coordinate respiration and phonation    Tongue trill, /n/ and Straw phonation with water resistance was found to be most facilitating     Ascending and descending glides utilized to promote vocal fold pliability    \"Messa di voce\", gradual crescendo and decrescendo to vary medial compression was also utilized to promote vocal fold pliability.    Instructed on the benefits of using these exercises for improved coordination of breath flow with phonation and tissue mobilization.    Instructed on the importance of using these exercises as a warm-up / cool down,  and to re-calibrate the voice throughout the day.    75% accuracy with mild to moderate clinician support    Resonant Voice Therapy (RVT) exercises to promote forward locus of resonance and optimized pattern of laryngeal adduction    Speech material that elicits a high, forward tongue position (/n/) was most facilitating    Easy descending glide on /n/ utilized in conjunction with relaxed jaw, tongue, and lightly closed lips to facilitate forward resonant sound    Use of the carrier phrase \"nnhnn\" instructed to promote generalization to everyday speech    Syllable level using /n/ in alternation with cardinal vowels on sustained pitches and speech inflection    Word level exercises featuring nasal continuant " loaded stimuli    Phrase level exercises featuring nasal continuants in more complex phonemic contexts were employed    Instructed with and interval of a descending 5th, as well as an arpeggio pattern was facilitating, prior to progressing to comfortable speech using optimal breath flow.    Negative practice was employed and was facilitative    Able to recognize improvement in quality and comfort      Concepts of an optimal regimen for practice were instructed.  o She should use an interval schedule of practice, with brief periods of practice frequently throughout each day  o [place] concepts of volitional practice to facilitate motor learning.    I provided an audio recording and handouts of today's therapeutic activities to facilitate practice.    ASSESSMENT/PLAN  PROGRESS TOWARD LONG TERM GOALS:   Minimal at this point, as this is first session, but good learning today    IMPRESSIONS: R49.0 (Dysphonia) and R07.0 (Throat Pain).     PLAN: I will see Ms. Mccain in 3 weeks, at which time we will continue to work with resonant voice techniques.     TOTAL SERVICE TIME: 30 minutes  EVALUATION OF VOICE AND RESONANCE: (34047): 60 minutes    TREATMENT (03799): 30 minutes  NO CHARGE FACILITY FEE (82463)    Edie Hope M.M. (voice) MLissyALissy, CCC/SLP  Speech-Language Pathologist  [facility]  [phone]

## 2018-01-29 ENCOUNTER — HEALTH MAINTENANCE LETTER (OUTPATIENT)
Age: 38
End: 2018-01-29

## 2018-04-26 ENCOUNTER — TELEPHONE (OUTPATIENT)
Dept: OTOLARYNGOLOGY | Facility: CLINIC | Age: 38
End: 2018-04-26

## 2018-05-25 ENCOUNTER — OFFICE VISIT (OUTPATIENT)
Dept: OTOLARYNGOLOGY | Facility: CLINIC | Age: 38
End: 2018-05-25
Payer: COMMERCIAL

## 2018-05-25 DIAGNOSIS — R07.0 THROAT PAIN: ICD-10-CM

## 2018-05-25 DIAGNOSIS — R49.0 DYSPHONIA: Primary | ICD-10-CM

## 2018-05-25 NOTE — PROGRESS NOTES
"After Visit Summary    Patient: Margret Mccain  Date of Visit: 2018    Breathing:    In the morning and evening (twice daily) for 2-5 minutes:   o Breathe while lying on your back with your face and knees up. Hands on tummy and chest.  Take a breath in with rounded lips and exhale with a  Shhh    o Inhale  = Inflate; exhale = deflate  o 3x each: try breathin in/8 out, 5/10, 3/    * Allow tummy to expand, but we want an expansion all around the middle (imagining with the ribcage \"alexis wings pivoting out and away with especially the lower portion expanding the furthest.  o Throughout the day (2-3x/day for just a couple minutes) check breathing while keeping shoulders relaxed (riding to and from school, etc.)      Breathing Tips:  o Keep shoulders down and relaxed (along with the chest)  o Breathe before all speech  o Take more breaths as you speak, it may feel weird, but try it and take the time to breathe comfortably.  Remember that a pause for breathing will also be helpful for the listener.     Voice (2-3x/day unless otherwise noted):    Semi-occluded vocal tract exercise:   o Bubbles (straw in 1.5 to 2  of water)/ Tongue trill if you want and feel confident (just keep it easy)  o 3-4x/day for 1-2 min (use as a warm up and cool down):  o 3x: blow bubbles and add a sustained  who  or an  oo  (Ab3)  o 3x: blow bubbles and vary  who  gliding up and down 1-5-1 (Ab3)             Up and down like a sine wave  o 3x: blow bubbles on a sustained/ varied pitch soft to loud to soft (messa di voce)    o 1-2x: Happy birthday bubbles (keep connected)  These exercises are great for:    *warm up / cool down - Part of the morning routing and before and after extended voice use.    *tissue mobilization exercise - Improving the condition and pliability of the vocal folds.    *Abdominal breathing and applying optimal breath flow to speech/singing.     2-3x/day     u  words (2-3 x per day)  o 5 words: try intermittently with " "the use of your arm to help direct breath flow    o Breathe first and add a  yawn & sigh  shape to sound    Today we chanted on Ab3, the progressed to 5-1, then yawn and sigh    *Ok to add a mix, vs all in a chest voice       Spacious speech phrases  (2-3x per day)  o First column  o 5-1, or 3-5-1    Blending Phrases  o Speaking with a shaped phrase    CONTINUE WORK WITH /n/ applying the strategies learned for optimal breath flow and blending.    Instructions for Life  o Try 5 of the longer phrases (#30) and beyond.  Practice taking more breath      [place] passage:    Practice taking a breath at most punctuation, blending, shaping phrases, and applying forward focus.    *Can you carry any of these ideas into your singing?  * Bring a musical theater piece and a mezzo-soprano aria so we can learn what is happening during your singing of different styles of music.  * We will also role play teaching a voice lesson.  What do you talk about? When do you breathe?...    : Bri \"Matthew\" David [phone]/ [email or web address]    Edie Hope M.M. (voice), M.A., CCC/SLP  Speech-Language Pathologist  Certificate of Vocology  [facility]  [phone]  [email or web address]  Prounouns: she/her    "

## 2018-05-25 NOTE — PROGRESS NOTES
"[facility] VOICE CLINIC  THERAPY NOTE (CPT 70110)    Patient: Margret Mccain  Date of Service: 5/25/2018  Referring physician: Dr. 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 Dysphonia/discomfort is accounted for by the hyperfunction and imbalanced function of the intrinsic and extrinsic laryngeal musculature.\"    SUBJECTIVE:  Since her last session, Ms. Mccain reports the following:     Overall she reports that symptoms are somewhat improved with the recommendations provided following her initial evaluation.    She was the \"wardrobe\" in a musical theater version of Beauty and the Beast recently, which went relatively well; however, she still experiences throat discomfort periodically while singing, and towards the end of long days.    OBJECTIVE:  AERO AND ACOUSTIC EVALUATION (40219)  An evaluation of her voice quality was completed today as a reassessment of the status of her symptoms, as she has not returned to our clinic since January 2018.    Fundamental frequency Metrics     /a/ mean F0 = 245 Hz (SD = 4.20 Hz)     /i/ mean F0 = 253 Hz (SD = 1.96 Hz)     [place] Passage Mean f0 = 204 Hz (SD 32.70 Hz)     Glide:         Min F0 = 70 Hz        Max F0 = 1128 Hz        Range = 1058 Hz         Cepstral Measures     CPPS /a/ = 28.66 dB     CPPS /i/ = 29.16 dB     CPPS \"all voiced\" = 23.81 dB     AVQI (v.3.01) = 0.73    Additional Measures     Harmonic to Noise Ratio /a/ = 26.01 dB     Harmonic to Noise Ratio: [place] passage = 13.69 dB     Jitter (local) /a/ = 0.294 %     Shimmer (local) /a/ = 1.286 %    Institution: [facility]  Name:   Address:  Date: May 25, 2018, Fri  Gender: Not Specified  Age: Not Specified  Diagnosis:   Acct. No.:   FILE:   Client Comments:   Exam Comments:   Normative Data: Female " 18-39    Parameter Result Units Norm. Mean Norm. Std Dev Comment    Vital Capacity       Expiratory Airflow Duration 6.88 Sec 8.30 3.22   Peak Expiratory Airflow 2.306 Lit/Sec 1.08 0.61   Expiratory Volume 4.46 Liters 3.08 0.57   Running Speech       Maximum SPL 72.94 dB     Mean Pitch 199.01 Hz     Pitch Range 121.55 Hz     Phonation Time 1.86 Sec     Expiratory Airflow Duration 3.48 Sec     Inspiratory Airflow Duration 0.50 Sec     Peak Expiratory Airflow 0.531 Lit/Sec     Mean Expiratory Airflow 0.258 Lit/Sec     Expiratory Volume 0.90 Liters     Mean Airflow During Voicing 0.109 Lit/Sec     Peak Inspiratory Airflow -1.165 Lit/Sec     Inspiratory Volume -0.33 Liters     Running Speech (Repeated)       Maximum SPL 76.92 dB     Mean Pitch 195.17 Hz     Pitch Range 255.22 Hz     Phonation Time 13.22 Sec     Expiratory Airflow Duration 17.28 Sec     Inspiratory Airflow Duration 1.56 Sec     Peak Expiratory Airflow 0.966 Lit/Sec     Mean Expiratory Airflow 0.159 Lit/Sec     Expiratory Volume 2.75 Liters     Mean Airflow During Voicing 0.149 Lit/Sec     Peak Inspiratory Airflow -1.803 Lit/Sec     Inspiratory Volume -1.36 Liters       LARYNGEAL EXAMINATION  Not warranted today, but discussed as a therapy tool for her next session.    PATIENT REPORTED MEASURES:  Patient Supplied Answers To SLP QOL Questionnaire  Therapy Quality of Life 5/22/2018   Since my l ast session, I used the speech therapy exercises and strategies as recommended by my speech pathologist. Agree   I feel that using my therapy techniques has become a habit Agree   I feel confident in my ability to manage my current and future symptoms. Disagree   Since my last session I feel my symptoms have --------. Stayed the same   Overall, since starting therapy I feel my symptoms are --------. About the same     THERAPEUTIC ACTIVITIES    Asked many questions about the nature of her symptoms, and I answered all of these thoroughly.    Demonstrated previous  "exercises.  o demonstrated improved technique  o appropriate redirection provided  o instruction provided for increased level of complexity/difficulty    Exercises to promote optimal respiratory mechanics    I provided explanation of the anatomy and physiology of respiration for speech and singing; she found this to be helpful    She demonstrated excessive upper thoracic engagement during inhalation    she demonstrated clavicular/neck/shoulder involvement in inhalation    Demonstrated difficulty allowing abdominal relaxation for inhalation    Practiced in a prone and supine position on the massage table, with tactile cue of a hand on the low rib-cage to facilitate awareness of low respiratory engagement.  Progressed to a seated and standing position today.    With clinician support, patient was able to demonstrate improved abdominal relaxation and engagement on inhalation    Optimal exhalation using inward engagement of the abdominal wall with no corresponding collapse of the upper chest cavity was trained using the pulsed \"sh\" task    [place] a respiratory pacing exercise; this was helpful    acceptable improvement in airflow and respiratory mechanics    Semi-Occluded Vocal Tract (SOVT) exercises instructed to reduce laryngeal tension, promote vocal fold pliability, and coordinate respiration and phonation    Straw phonation with water resistance was found to be most facilitating     Sustained phonation, and voice vs. voiceless productions used to promote easy voicing and raise awareness of laryngeal tension    Ascending and descending glides utilized to promote vocal fold pliability    \"Messa di voce\", gradual crescendo and decrescendo to vary medial compression was also utilized to promote vocal fold pliability.    Instructed on the benefits of using these exercises for improved coordination of breath flow with phonation and tissue mobilization.    Instructed on the importance of using these exercises as a " warm-up / cool down,  and to re-calibrate the voice throughout the day.    Exercises in techniques for improved airflow during phonation    Speech material with /ju/ glides was facilitating at the word level.    Progressed to easy onset/ flow, and blending phrases    Instructed with a descending 5th, as well as an arpeggio pattern; this was helpful.    [place] techniques to reduce glottal cui and improve breath flow; negative practice improved awareness today.    Instructed in techniques to improve length of utterance with reduced effort for optimal carryover.  o Instructed with phrases of increasing length, as well as the [place] passage; this was helpful    Developed a mental checklist of factors to help trouble shoot moments of difficulty during daily speaking tasks.    Counseling and Education:    I provided a printed summary, an audio recording (phone) and handouts of today's therapeutic activities to facilitate practice.    ASSESSMENT/PLAN  PROGRESS TOWARD LONG TERM GOALS:   Adequate progress; too early for objective measures    IMPRESSIONS: R49.0 (Dysphonia) and R07.0 (Throat Pain).  Today, we began with completion of aero and acoustic testing.  Ms. Mccain demonstrated a good voice quality over all today; however, measures also demonstrated overly forceful breath flow and she often spoke towards or past the end of her breath stream.  Therefore, therapeutic activities learned today were focused on improving coordination of her breath flow with phonation.    PLAN: I will see Ms. Mccain in 3 weeks, as I encouraged her to schedule regular appointments.  At that time, we may use endoscopy to help her better understand how to better coordinate breath flow and speech, while maintaining an optimal forward focus.   For practice goals see AVS.     TOTAL SERVICE TIME: 60 minutes  TREATMENT (68947): 45 minutes  NO CHARGE FACILITY FEE (61142)   AERO AND ACOUSTIC EVALUATION (60122) 15 min    Edie Hope M.M. (voice),  M.A., CCC/SLP  Speech-Language Pathologist  Certificate of Vocology  [facility]  [phone]  [email or web address]  Prounouns: she/her

## 2018-05-25 NOTE — LETTER
"5/25/2018       RE: Margret Mccain  [address] [facility] [address]     Dear Colleague,    Thank you for referring your patient, Margret Mccain, to the [facility]. Please see a copy of my visit note below.    [facility] VOICE CLINIC  THERAPY NOTE (CPT 44296)    Patient: Margret Mccain  Date of Service: 5/25/2018  Referring physician: Dr. 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 Dysphonia/discomfort is accounted for by the hyperfunction and imbalanced function of the intrinsic and extrinsic laryngeal musculature.\"    SUBJECTIVE:  Since her last session, Ms. Mccain reports the following:     Overall she reports that symptoms are somewhat improved with the recommendations provided following her initial evaluation.    She was the \"wardrobe\" in a musical theater version of Beauty and the Beast recently, which went relatively well; however, she still experiences throat discomfort periodically while singing, and towards the end of long days.    OBJECTIVE:  AERO AND ACOUSTIC EVALUATION (63512)  An evaluation of her voice quality was completed today as a reassessment of the status of her symptoms, as she has not returned to our clinic since January 2018.    Fundamental frequency Metrics     /a/ mean F0 = 245 Hz (SD = 4.20 Hz)     /i/ mean F0 = 253 Hz (SD = 1.96 Hz)     [place] Passage Mean f0 = 204 Hz (SD 32.70 Hz)     Glide:         Min F0 = 70 Hz        Max F0 = 1128 Hz        Range = 1058 Hz         Cepstral Measures     CPPS /a/ = 28.66 dB     CPPS /i/ = 29.16 dB     CPPS \"all voiced\" = 23.81 dB     AVQI (v.3.01) = 0.73    Additional Measures     Harmonic to Noise Ratio /a/ = 26.01 dB     Harmonic to Noise Ratio: [place] passage = 13.69 dB     Jitter (local) /a/ = " 0.294 %     Shimmer (local) /a/ = 1.286 %    Institution: [facility]  Name:   Address:  Date: May 25, 2018, Fri  Gender: Not Specified  Age: Not Specified  Diagnosis:   Acct. No.:   FILE:   Client Comments:   Exam Comments:   Normative Data: Female 18-39    Parameter Result Units Norm. Mean Norm. Std Dev Comment    Vital Capacity       Expiratory Airflow Duration 6.88 Sec 8.30 3.22   Peak Expiratory Airflow 2.306 Lit/Sec 1.08 0.61   Expiratory Volume 4.46 Liters 3.08 0.57   Running Speech       Maximum SPL 72.94 dB     Mean Pitch 199.01 Hz     Pitch Range 121.55 Hz     Phonation Time 1.86 Sec     Expiratory Airflow Duration 3.48 Sec     Inspiratory Airflow Duration 0.50 Sec     Peak Expiratory Airflow 0.531 Lit/Sec     Mean Expiratory Airflow 0.258 Lit/Sec     Expiratory Volume 0.90 Liters     Mean Airflow During Voicing 0.109 Lit/Sec     Peak Inspiratory Airflow -1.165 Lit/Sec     Inspiratory Volume -0.33 Liters     Running Speech (Repeated)       Maximum SPL 76.92 dB     Mean Pitch 195.17 Hz     Pitch Range 255.22 Hz     Phonation Time 13.22 Sec     Expiratory Airflow Duration 17.28 Sec     Inspiratory Airflow Duration 1.56 Sec     Peak Expiratory Airflow 0.966 Lit/Sec     Mean Expiratory Airflow 0.159 Lit/Sec     Expiratory Volume 2.75 Liters     Mean Airflow During Voicing 0.149 Lit/Sec     Peak Inspiratory Airflow -1.803 Lit/Sec     Inspiratory Volume -1.36 Liters       LARYNGEAL EXAMINATION  Not warranted today, but discussed as a therapy tool for her next session.    PATIENT REPORTED MEASURES:  Patient Supplied Answers To SLP QOL Questionnaire  Therapy Quality of Life 5/22/2018   Since my l ast session, I used the speech therapy exercises and strategies as recommended by my speech pathologist. Agree   I feel that using my therapy techniques has become a habit Agree   I feel confident in my ability to manage my current and future symptoms. Disagree   Since my last session I feel my symptoms have --------.  "Stayed the same   Overall, since starting therapy I feel my symptoms are --------. About the same     THERAPEUTIC ACTIVITIES    Asked many questions about the nature of her symptoms, and I answered all of these thoroughly.    Demonstrated previous exercises.  o demonstrated improved technique  o appropriate redirection provided  o instruction provided for increased level of complexity/difficulty    Exercises to promote optimal respiratory mechanics    I provided explanation of the anatomy and physiology of respiration for speech and singing; she found this to be helpful    She demonstrated excessive upper thoracic engagement during inhalation    she demonstrated clavicular/neck/shoulder involvement in inhalation    Demonstrated difficulty allowing abdominal relaxation for inhalation    Practiced in a prone and supine position on the massage table, with tactile cue of a hand on the low rib-cage to facilitate awareness of low respiratory engagement.  Progressed to a seated and standing position today.    With clinician support, patient was able to demonstrate improved abdominal relaxation and engagement on inhalation    Optimal exhalation using inward engagement of the abdominal wall with no corresponding collapse of the upper chest cavity was trained using the pulsed \"sh\" task    [place] a respiratory pacing exercise; this was helpful    acceptable improvement in airflow and respiratory mechanics    Semi-Occluded Vocal Tract (SOVT) exercises instructed to reduce laryngeal tension, promote vocal fold pliability, and coordinate respiration and phonation    Straw phonation with water resistance was found to be most facilitating     Sustained phonation, and voice vs. voiceless productions used to promote easy voicing and raise awareness of laryngeal tension    Ascending and descending glides utilized to promote vocal fold pliability    \"Messa di voce\", gradual crescendo and decrescendo to vary medial compression was also " utilized to promote vocal fold pliability.    Instructed on the benefits of using these exercises for improved coordination of breath flow with phonation and tissue mobilization.    Instructed on the importance of using these exercises as a warm-up / cool down,  and to re-calibrate the voice throughout the day.    Exercises in techniques for improved airflow during phonation    Speech material with /ju/ glides was facilitating at the word level.    Progressed to easy onset/ flow, and blending phrases    Instructed with a descending 5th, as well as an arpeggio pattern; this was helpful.    [place] techniques to reduce glottal cui and improve breath flow; negative practice improved awareness today.    Instructed in techniques to improve length of utterance with reduced effort for optimal carryover.  o Instructed with phrases of increasing length, as well as the [place] passage; this was helpful    Developed a mental checklist of factors to help trouble shoot moments of difficulty during daily speaking tasks.    Counseling and Education:    I provided a printed summary, an audio recording (phone) and handouts of today's therapeutic activities to facilitate practice.    ASSESSMENT/PLAN  PROGRESS TOWARD LONG TERM GOALS:   Adequate progress; too early for objective measures    IMPRESSIONS: R49.0 (Dysphonia) and R07.0 (Throat Pain).  Today, we began with completion of aero and acoustic testing.  Ms. Mccain demonstrated a good voice quality over all today; however, measures also demonstrated overly forceful breath flow and she often spoke towards or past the end of her breath stream.  Therefore, therapeutic activities learned today were focused on improving coordination of her breath flow with phonation.    PLAN: I will see Ms. Mccain in 3 weeks, as I encouraged her to schedule regular appointments.  At that time, we may use endoscopy to help her better understand how to better coordinate breath flow and speech, while  "maintaining an optimal forward focus.   For practice goals see AVS.     TOTAL SERVICE TIME: 60 minutes  TREATMENT (20339): 45 minutes  NO CHARGE FACILITY FEE (93482)   AERO AND ACOUSTIC EVALUATION (18863) 15 min    Edie Hope M.M. (voice), M.A., CCC/SLP  Speech-Language Pathologist  Certificate of Vocology  [facility]  [phone]  [email or web address]  Prounouns: she/her        After Visit Summary    Patient: Margret Mccain  Date of Visit: 2018    Breathing:    In the morning and evening (twice daily) for 2-5 minutes:   o Breathe while lying on your back with your face and knees up. Hands on tummy and chest.  Take a breath in with rounded lips and exhale with a  Shhh    o Inhale  = Inflate; exhale = deflate  o 3x each: try breathin in/8 out, 5/10, 3/4    * Allow tummy to expand, but we want an expansion all around the middle (imagining with the ribcage \"alexis wings pivoting out and away with especially the lower portion expanding the furthest.  o Throughout the day (2-3x/day for just a couple minutes) check breathing while keeping shoulders relaxed (riding to and from school, etc.)      Breathing Tips:  o Keep shoulders down and relaxed (along with the chest)  o Breathe before all speech  o Take more breaths as you speak, it may feel weird, but try it and take the time to breathe comfortably.  Remember that a pause for breathing will also be helpful for the listener.     Voice (2-3x/day unless otherwise noted):    Semi-occluded vocal tract exercise:   o Bubbles (straw in 1.5 to 2  of water)/ Tongue trill if you want and feel confident (just keep it easy)  o 3-4x/day for 1-2 min (use as a warm up and cool down):  o 3x: blow bubbles and add a sustained  who  or an  oo  (Ab3)  o 3x: blow bubbles and vary  who  gliding up and down 1-5-1 (Ab3)             Up and down like a sine wave  o 3x: blow bubbles on a sustained/ varied pitch soft to loud to soft (messa di voce)    o 1-2x: Happy " "birthday bubbles (keep connected)  These exercises are great for:    *warm up / cool down - Part of the morning routing and before and after extended voice use.    *tissue mobilization exercise - Improving the condition and pliability of the vocal folds.    *Abdominal breathing and applying optimal breath flow to speech/singing.     2-3x/day     u  words (2-3 x per day)  o 5 words: try intermittently with the use of your arm to help direct breath flow    o Breathe first and add a  yawn & sigh  shape to sound    Today we chanted on Ab3, the progressed to 5-1, then yawn and sigh    *Ok to add a mix, vs all in a chest voice       Spacious speech phrases  (2-3x per day)  o First column  o 5-1, or 3-5-1    Blending Phrases  o Speaking with a shaped phrase    CONTINUE WORK WITH /n/ applying the strategies learned for optimal breath flow and blending.    Instructions for Life  o Try 5 of the longer phrases (#30) and beyond.  Practice taking more breath      [place] passage:    Practice taking a breath at most punctuation, blending, shaping phrases, and applying forward focus.    *Can you carry any of these ideas into your singing?  * Bring a musical theater piece and a mezzo-soprano aria so we can learn what is happening during your singing of different styles of music.  * We will also role play teaching a voice lesson.  What do you talk about? When do you breathe?...    : Bri \"Matthew\" David [phone]/ [email or web address]    Edie Hope M.M. (voice), M.A., CCC/SLP  Speech-Language Pathologist  Certificate of Vocology  [facility]  [phone]  [email or web address]  Prounouns: she/her      Again, thank you for allowing me to participate in the care of your patient.      Sincerely,    Edie Hope, SLP      "

## 2018-05-25 NOTE — LETTER
Date:June 4, 2018      Patient was self referred, no letter generated. Do not send.        [facility] Information

## 2018-05-25 NOTE — MR AVS SNAPSHOT
After Visit Summary   5/25/2018    Margret Mccain    MRN: [id number]           Patient Information     Date Of Birth          1980        Visit Information        Provider Department      5/25/2018 1:00 PM Edie Hope SLP [facility]        Today's Diagnoses     Dysphonia    -  1    Throat pain           Follow-ups after your visit        Your next 10 appointments already scheduled     Jun 14, 2018  1:00 PM CDT   (Arrive by 12:45 PM)   RETURN SLP VOICE with [facility] ([facility])    [address] [place] [address] [id number] [phone]            Jun 28, 2018  1:00 PM CDT   (Arrive by 12:45 PM)   RETURN SLP [facility]ICE [facility] ([facility])    [address] [place] [id number] [phone]            Jul 12, 2018  1:00 PM CDT   (Arrive by 12:45 PM)   RETURN SLP [facility]ICE [facility] ([facility])    [address] [place] [address] [id number] [phone]              Who to contact     Please call your clinic at [phone] to:    Ask questions about your health    Make or cancel appointments    Discuss your medicines    Learn about your test results    Speak to your doctor            Additional Information About Your Visit        MyChart Information     [employer] gives you secure access to your electronic health record. If you see a primary care provider, you can also send messages to your care team and make appointments. If you have questions, please call your primary care clinic.  If you do not have a primary care provider, please call [phone] and they will assist you.      [employer] is an electronic gateway that provides easy, online access to your medical records. With [employer], you can request a clinic appointment, read your test results, renew a prescription or  communicate with your care team.     To access your existing account, please contact your [facility] or call [phone] for assistance.        Care EveryWhere ID     This is your Care EveryWhere ID. This could be used by other organizations to access your [place] medical records  [id number]         Blood Pressure from Last 3 Encounters:   No data found for BP    Weight from Last 3 Encounters:   01/15/18 71.7 kg (158 lb)              We Performed the Following     LARYNGEAL FUNCTION STUDIES     SPEECH/HEARING THERAPY, INDIVIDUAL        Primary Care Provider    None Specified       No primary provider on file.        Equal Access to Services     [facility]: Hadii aad andrey hadanthony Soinderjit, waaxda luqadaha, qaybta kaalmarajesh adam, cristin hoang . So [facility] [phone].    ATENCIÓN: Si habla español, tiene a kwong disposición servicios gratuitos de asistencia lingüística. Llame al [phone].    We comply with applicable federal civil rights laws and [place] laws. We do not discriminate on the basis of race, color, national origin, age, disability, sex, sexual orientation, or gender identity.            Thank you!     Thank you for choosing [facility]  for your care. Our goal is always to provide you with excellent care. Hearing back from our patients is one way we can continue to improve our services. Please take a few minutes to complete the written survey that you may receive in the mail after your visit with us. Thank you!             Your Updated Medication List - Protect others around you: Learn how to safely use, store and throw away your medicines at www.disposemymeds.org.          This list is accurate as of 5/25/18 11:59 PM.  Always use your most recent med list.                   Brand Name Dispense Instructions for use Diagnosis    DEXILANT 60 MG Cpdr CR capsule   Generic drug:  dexlansoprazole           IRON SUPPLEMENT PO            VITAMIN D (CHOLECALCIFEROL) PO      Take 5,000 Units by mouth daily

## 2018-06-14 ENCOUNTER — OFFICE VISIT (OUTPATIENT)
Dept: OTOLARYNGOLOGY | Facility: CLINIC | Age: 38
End: 2018-06-14
Payer: COMMERCIAL

## 2018-06-14 DIAGNOSIS — R07.0 THROAT PAIN: ICD-10-CM

## 2018-06-14 DIAGNOSIS — R49.0 DYSPHONIA: Primary | ICD-10-CM

## 2018-06-14 NOTE — LETTER
"6/14/2018       RE: Margret Mccain  [facility] [facility] [address]     Dear Colleague,    Thank you for referring your patient, Margret Mccain, to the [facility]. Please see a copy of my visit note below.      [facility]ICE [facility]INIC  THERAPY NOTE (CPT 25124)    Patient: Margret Mccain  Date of Service: 6/14/2018  Referring physician: Dr. 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 Dysphonia/discomfort is accounted for by the hyperfunction and imbalanced function of the intrinsic and extrinsic laryngeal musculature.\"    SUBJECTIVE:  Since her last session, Ms. Mccain reports the following:     Overall she reports that symptoms are stable, and she continues to struggle with her voice.  Often fatiguing after 20 minutes of any vocal activity.    /n/ words and phrases continue to be helpful for improving her speaking voice.    OBJECTIVE:  Ms. Mccain presents today with the following:  Voice quality:    Moderate back focus    A pitch glide task today showed that Ms. Mccain demonstrated difficulty beginning around B4    PATIENT REPORTED MEASURES:  Patient Supplied Answers To SLP QOL Questionnaire  Therapy Quality of Life 6/14/2018   Since my l ast session, I used the speech therapy exercises and strategies as recommended by my speech pathologist. Agree   I feel that using my therapy techniques has become a habit Agree   I feel confident in my ability to manage my current and future symptoms. Neither agree nor disagree   Since my last session I feel my symptoms have --------. Stayed the same   Overall, since starting therapy I feel my symptoms are --------. About the same     THERAPEUTIC ACTIVITIES    Demonstrated previous exercises.  o demonstrated improved " "technique  o appropriate redirection provided  o instruction provided for increased level of complexity/difficulty    Resonant Voice Therapy (RVT) exercises to promote forward locus of resonance and optimized pattern of laryngeal adduction    Speech material that elicits a high, forward tongue position (/n/) was most facilitating    Word level exercises featuring nasal continuant loaded stimuli    Phrase level exercises featuring nasal continuants in more complex phonemic contexts were employed    Instructed with and interval of a descending 5th, as well as an arpeggio pattern was facilitating, prior to progressing to comfortable speech using optimal breath flow.    Negative practice was employed and was somewhat facilitative    Difficulty recognizing improvement in quality and comfort    Exercises to maintain the improved airflow and forward focus in singing  o did a variety of glides, scales, and arpeggio patterns on a variety of neutral syllables including:  - 1-5-1  - 1-3-5-8-5-3-1  - 1-3-1-5-1  o Instruction with a twang/ eliciting high forward tongue position sound (yeah) was helpful to reduce back focus, and increase awareness of the similarities between the speaking and singing voice.  o Use of \"ng\" was helpful to reduce base of tongue involvement  o \"h-ng\" to reduce excessive TA contraction in upper register.  o Use of \"cristi\" and \"blah\" was facilitating today  o Alternating with SOVT exercises was facilitating today to improve easy, as well as safe, easy access in the upper register, as well as    o \"who\" and \"whoop\" (/wh/ glides), were utilized to help improve balance between CT and TA muscles with 1-8-1, 1-3-5-8-5-3-1  o learned how to apply the concepts to singing repertoire    Manual laryngeal massage was performed:    Significant tenderness of the thyrohyoid space with corresponding reduction of space     Thyrohyoid space, greater horns of the hyoid, and base of tongue were targeted with gentle " circular massage    Gentle lateralization of the larynx, hyoid tilt, and sternocleidomastoid massage was also performed.    Patient was trained to focus on intentional relaxation of jaw and tongue in addition to area of massage during these maneuvers.    Comfortably quiet forward resonant /m/ on descending glides was utilized throughout massage    Self-massage was instructed and patient was able to demonstrate this with acceptable accuracy    We discussed the possibility of working with a physical therapist for additional therapeutic exercises, as well as myofascial release.  she was amenable to this recommendation.    She reported this as somewhat helpful, but noted limited awareness of level of discomfort and relief.  [place] exercises for upper body relaxation during phonation.  o demonstrated moderate upper body tension  o good self-awareness while completing stretches for the upper body and neck.  o Demonstrated limited awareness relief following this activity    Counseling and Education:    Asked many questions about the nature of her symptoms, and I answered all of these thoroughly.  After much discussion, we discussed moving forward with an additional therapy session, and also working with my colleague, Dr. Dixon for additional perspective on her voice concerns.  She was amenable to my recommendations.      Considering her upcoming singing, we discussed talking with the director and seeing if it is possible to transpose her singing parts, in order to sing in a more comfortable range.      I also recommended that she pursue work with a private .    Therapeutic practice recommendations were emailed, and I encouraged her to journal her progress and send me weekly updates.    ASSESSMENT/PLAN  PROGRESS TOWARD LONG TERM GOALS:   Adequate progress; too early for objective measures    IMPRESSIONS:R49.0 (Dysphonia) and R07.0 (Throat Pain).      PLAN: I will see Ms. Mccain in 2 weeks.   For practice  goals see AVS.     TOTAL SERVICE TIME: 60 minutes  TREATMENT (88429): 60 minutes  NO CHARGE FACILITY FEE (43090)    Edie Hope M.M. (voice), M.A., CCC/SLP  Speech-Language Pathologist  Certificate of Vocology  [facility]  [phone]  [email or web address]  Prounouns: she/her

## 2018-06-14 NOTE — MR AVS SNAPSHOT
After Visit Summary   6/14/2018    Margret Mccain    MRN: [id number]           Patient Information     Date Of Birth          1980        Visit Information        Provider Department      6/14/2018 1:00 PM Edie Hope SLP [facility]        Today's Diagnoses     Dysphonia    -  1    Throat pain           Follow-ups after your visit        Your next 10 appointments already scheduled     Jun 28, 2018  1:00 PM CDT   (Arrive by 12:45 PM)   [facility]LP [facility])    [address] [place] [id number] [phone]            Jul 13, 2018  2:30 PM CDT   (Arrive by 2:15 PM)   RETURN SLP VOICE with [facility] ([facility])    [address] [place] [id number] [phone]              Who to contact     Please call your clinic at [phone] to:    Ask questions about your health    Make or cancel appointments    Discuss your medicines    Learn about your test results    Speak to your doctor            Additional Information About Your Visit        MyChart Information     [employer] gives you secure access to your electronic health record. If you see a primary care provider, you can also send messages to your care team and make appointments. If you have questions, please call your primary care clinic.  If you do not have a primary care provider, please call [phone] and they will assist you.      [employer] is an electronic gateway that provides easy, online access to your medical records. With [employer], you can request a clinic appointment, read your test results, renew a prescription or communicate with your care team.     To access your existing account, please contact your [facility] or call [phone] for assistance.        Care EveryWhere ID     This is your Care EveryWhere ID. This  could be used by other organizations to access your [place] medical records  [id number]         Blood Pressure from Last 3 Encounters:   No data found for BP    Weight from Last 3 Encounters:   01/15/18 71.7 kg (158 lb)              We Performed the Following     SPEECH/HEARING THERAPY, INDIVIDUAL        Primary Care Provider    None Specified       No primary provider on file.        Equal Access to Services     [facility]: Hadii aad ku hadbaldemaro Soallyali, waaxda luqadaha, qaybta kaalmada rikkiyusrarajesh, cristin ngregancristy hoang . So [facility] [phone].    ATENCIÓN: Si habla español, tiene a kwong disposición servicios gratuitos de asistencia lingüística. Llame al [phone].    We comply with applicable federal civil rights laws and [place] laws. We do not discriminate on the basis of race, color, national origin, age, disability, sex, sexual orientation, or gender identity.            Thank you!     Thank you for choosing [facility]  for your care. Our goal is always to provide you with excellent care. Hearing back from our patients is one way we can continue to improve our services. Please take a few minutes to complete the written survey that you may receive in the mail after your visit with us. Thank you!             Your Updated Medication List - Protect others around you: Learn how to safely use, store and throw away your medicines at www.disposemymeds.org.          This list is accurate as of 6/14/18 11:59 PM.  Always use your most recent med list.                   Brand Name Dispense Instructions for use Diagnosis    DEXILANT 60 MG Cpdr CR capsule   Generic drug:  dexlansoprazole           IRON SUPPLEMENT PO           VITAMIN D (CHOLECALCIFEROL) PO      Take 5,000 Units by mouth daily

## 2018-06-23 NOTE — PROGRESS NOTES
"  [facility] CLINIC  THERAPY NOTE (CPT 79261)    Patient: Margret Mccain  Date of Service: 6/14/2018  Referring physician: Dr. 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 Dysphonia/discomfort is accounted for by the hyperfunction and imbalanced function of the intrinsic and extrinsic laryngeal musculature.\"    SUBJECTIVE:  Since her last session, Ms. Mccain reports the following:     Overall she reports that symptoms are stable, and she continues to struggle with her voice.  Often fatiguing after 20 minutes of any vocal activity.    /n/ words and phrases continue to be helpful for improving her speaking voice.    OBJECTIVE:  Ms. Mccain presents today with the following:  Voice quality:    Moderate back focus    A pitch glide task today showed that Ms. Mccain demonstrated difficulty beginning around B4    PATIENT REPORTED MEASURES:  Patient Supplied Answers To SLP QOL Questionnaire  Therapy Quality of Life 6/14/2018   Since my l ast session, I used the speech therapy exercises and strategies as recommended by my speech pathologist. Agree   I feel that using my therapy techniques has become a habit Agree   I feel confident in my ability to manage my current and future symptoms. Neither agree nor disagree   Since my last session I feel my symptoms have --------. Stayed the same   Overall, since starting therapy I feel my symptoms are --------. About the same     THERAPEUTIC ACTIVITIES    Demonstrated previous exercises.  o demonstrated improved technique  o appropriate redirection provided  o instruction provided for increased level of complexity/difficulty    Resonant Voice Therapy (RVT) exercises to promote forward locus of resonance and optimized pattern of laryngeal adduction    Speech material that elicits a " "high, forward tongue position (/n/) was most facilitating    Word level exercises featuring nasal continuant loaded stimuli    Phrase level exercises featuring nasal continuants in more complex phonemic contexts were employed    Instructed with and interval of a descending 5th, as well as an arpeggio pattern was facilitating, prior to progressing to comfortable speech using optimal breath flow.    Negative practice was employed and was somewhat facilitative    Difficulty recognizing improvement in quality and comfort    Exercises to maintain the improved airflow and forward focus in singing  o did a variety of glides, scales, and arpeggio patterns on a variety of neutral syllables including:  - 1-5-1  - 1-3-5-8-5-3-1  - 1-3-1-5-1  o Instruction with a twang/ eliciting high forward tongue position sound (yeah) was helpful to reduce back focus, and increase awareness of the similarities between the speaking and singing voice.  o Use of \"ng\" was helpful to reduce base of tongue involvement  o \"h-ng\" to reduce excessive TA contraction in upper register.  o Use of \"cristi\" and \"blah\" was facilitating today  o Alternating with SOVT exercises was facilitating today to improve easy, as well as safe, easy access in the upper register, as well as    o \"who\" and \"whoop\" (/wh/ glides), were utilized to help improve balance between CT and TA muscles with 1-8-1, 1-3-5-8-5-3-1  o learned how to apply the concepts to singing repertoire    Manual laryngeal massage was performed:    Significant tenderness of the thyrohyoid space with corresponding reduction of space     Thyrohyoid space, greater horns of the hyoid, and base of tongue were targeted with gentle circular massage    Gentle lateralization of the larynx, hyoid tilt, and sternocleidomastoid massage was also performed.    Patient was trained to focus on intentional relaxation of jaw and tongue in addition to area of massage during these maneuvers.    Comfortably quiet forward " resonant /m/ on descending glides was utilized throughout massage    Self-massage was instructed and patient was able to demonstrate this with acceptable accuracy    We discussed the possibility of working with a physical therapist for additional therapeutic exercises, as well as myofascial release.  she was amenable to this recommendation.    She reported this as somewhat helpful, but noted limited awareness of level of discomfort and relief.  [place] exercises for upper body relaxation during phonation.  o demonstrated moderate upper body tension  o good self-awareness while completing stretches for the upper body and neck.  o Demonstrated limited awareness relief following this activity    Counseling and Education:    Asked many questions about the nature of her symptoms, and I answered all of these thoroughly.  After much discussion, we discussed moving forward with an additional therapy session, and also working with my colleague, Dr. Dixon for additional perspective on her voice concerns.  She was amenable to my recommendations.      Considering her upcoming singing, we discussed talking with the director and seeing if it is possible to transpose her singing parts, in order to sing in a more comfortable range.      I also recommended that she pursue work with a private .    Therapeutic practice recommendations were emailed, and I encouraged her to journal her progress and send me weekly updates.    ASSESSMENT/PLAN  PROGRESS TOWARD LONG TERM GOALS:   Adequate progress; too early for objective measures    IMPRESSIONS:R49.0 (Dysphonia) and R07.0 (Throat Pain).      PLAN: I will see Ms. Mccain in 2 weeks.   For practice goals see AVS.     TOTAL SERVICE TIME: 60 minutes  TREATMENT (86370): 60 minutes  NO CHARGE FACILITY FEE (05879)    Edie Hope M.M. (voice), M.A., CCC/SLP  Speech-Language Pathologist  Certificate of Vocology  [facility]  [facility]  [phone]  [email or web address]  Prounouns: she/her

## 2020-03-11 ENCOUNTER — HEALTH MAINTENANCE LETTER (OUTPATIENT)
Age: 40
End: 2020-03-11

## 2021-01-03 ENCOUNTER — HEALTH MAINTENANCE LETTER (OUTPATIENT)
Age: 41
End: 2021-01-03

## 2021-04-25 ENCOUNTER — HEALTH MAINTENANCE LETTER (OUTPATIENT)
Age: 41
End: 2021-04-25

## 2021-10-09 ENCOUNTER — HEALTH MAINTENANCE LETTER (OUTPATIENT)
Age: 41
End: 2021-10-09

## 2022-05-21 ENCOUNTER — HEALTH MAINTENANCE LETTER (OUTPATIENT)
Age: 42
End: 2022-05-21

## 2022-09-17 ENCOUNTER — HEALTH MAINTENANCE LETTER (OUTPATIENT)
Age: 42
End: 2022-09-17

## 2023-06-04 ENCOUNTER — HEALTH MAINTENANCE LETTER (OUTPATIENT)
Age: 43
End: 2023-06-04

## 2024-02-25 ENCOUNTER — HEALTH MAINTENANCE LETTER (OUTPATIENT)
Age: 44
End: 2024-02-25